# Patient Record
Sex: FEMALE | Race: WHITE | NOT HISPANIC OR LATINO | Employment: FULL TIME | URBAN - METROPOLITAN AREA
[De-identification: names, ages, dates, MRNs, and addresses within clinical notes are randomized per-mention and may not be internally consistent; named-entity substitution may affect disease eponyms.]

---

## 2022-01-05 ENCOUNTER — OFFICE VISIT (OUTPATIENT)
Dept: OBSTETRICS AND GYNECOLOGY | Facility: CLINIC | Age: 37
End: 2022-01-05

## 2022-01-05 VITALS
WEIGHT: 136.6 LBS | BODY MASS INDEX: 24.2 KG/M2 | DIASTOLIC BLOOD PRESSURE: 82 MMHG | HEIGHT: 63 IN | SYSTOLIC BLOOD PRESSURE: 124 MMHG

## 2022-01-05 DIAGNOSIS — N63.0 BREAST LUMP IN FEMALE: Primary | ICD-10-CM

## 2022-01-05 PROCEDURE — 99203 OFFICE O/P NEW LOW 30 MIN: CPT | Performed by: OBSTETRICS & GYNECOLOGY

## 2022-01-05 NOTE — PROGRESS NOTES
Chief Complaint   Patient presents with   • Establish Care   • Gynecologic Exam   • Breast Problem     right breast lump        Subjective   HPI  Bianca Chan is a 36 y.o. female, , who presents for established care, right breast lump is initial.      She states she has experienced this problem for 2 years but has worsened the past 6 months.  She describes the severity as mild-moderate. She states that the problem is worsening in size. The patient reports additional symptoms as breast tenderness.      Her last LMP was 2021.  Periods are regular every 28-30 days, lasting 5 days.  Dysmenorrhea:none.  Patient reports problems with: none.  Partner Status: Marital Status: .  New Partners since last visit: no.  Desires STD Screening: no.    Additional OB/GYN History   Current contraception: contraceptive methods: None  Desires to: do not start contraception  Last Pap : 2020, negative per pt. (performed at [facility], [place])  Last Completed Pap Smear     This patient has no relevant Health Maintenance data.        History of abnormal Pap smear: yes - 10-12 years ago, ASCUS but follow ups were negative  Last mammogram: , negative per pt. (performed in [place])  Last Completed Mammogram     This patient has no relevant Health Maintenance data.        Tobacco Usage?: No   OB History        2    Para   2    Term   2            AB        Living   2       SAB        IAB        Ectopic        Molar        Multiple        Live Births                    Health Maintenance   Topic Date Due   • Annual Gynecologic Pelvic and Breast Exam  Never done   • ANNUAL PHYSICAL  Never done   • COVID-19 Vaccine (1) Never done   • TDAP/TD VACCINES (1 - Tdap) Never done   • INFLUENZA VACCINE  Never done   • HEPATITIS C SCREENING  Never done   • PAP SMEAR  Never done   • Pneumococcal Vaccine 0-64  Aged Out       The additional following portions of the patient's history were reviewed and updated as  "appropriate: allergies, current medications, past family history, past medical history, past social history, past surgical history and problem list.    Review of Systems   Constitutional: Negative for activity change, appetite change, unexpected weight gain and unexpected weight loss.   Eyes: Negative for visual disturbance.   Respiratory: Negative for cough and shortness of breath.    Cardiovascular: Negative for chest pain and palpitations.   Gastrointestinal: Negative for abdominal distention, abdominal pain, nausea and vomiting.   Genitourinary: Positive for breast lump and breast pain. Negative for breast discharge, menstrual problem and pelvic pain.   Musculoskeletal: Negative for back pain.   Neurological: Negative for light-headedness and headache.   Psychiatric/Behavioral: Negative for agitation, behavioral problems, decreased concentration and depressed mood.       I have reviewed and agree with the HPI, ROS, and historical information as entered above. Moe Sky MD    Objective   /82   Ht 160 cm (63\")   Wt 62 kg (136 lb 9.6 oz)   LMP 12/25/2021 (Exact Date)   Breastfeeding No   BMI 24.20 kg/m²     Physical Exam  Vitals reviewed. Exam conducted with a chaperone present.   Constitutional:       Appearance: Normal appearance. She is normal weight.   HENT:      Head: Normocephalic and atraumatic.   Chest:   Breasts:      Right: Mass and tenderness present. No swelling, bleeding, inverted nipple, nipple discharge or skin change.      Left: Normal. No swelling, bleeding, inverted nipple, mass, nipple discharge, skin change or tenderness.            Comments: Multiple mobile masses palpated in the right breast  Skin:     General: Skin is warm and dry.   Neurological:      Mental Status: She is alert and oriented to person, place, and time.   Psychiatric:         Mood and Affect: Mood normal.         Behavior: Behavior normal.         Assessment/Plan     Assessment     Problem List Items " Addressed This Visit        Genitourinary and Reproductive     Breast lump in female - Primary    Relevant Orders    Ambulatory Referral to General Surgery (Completed)          1. Breast mass    Plan     Return for Referral made to [facility] Hern[facility]dez for breast mass. Follow up with me in June for annual GYN exam.  1. Will f/u in 6 months for annual GYN exam      Moe Sky MD  01/05/2022

## 2022-01-17 ENCOUNTER — TRANSCRIBE ORDERS (OUTPATIENT)
Dept: MAMMOGRAPHY | Facility: HOSPITAL | Age: 37
End: 2022-01-17

## 2022-01-17 DIAGNOSIS — N63.11 UNSPECIFIED LUMP IN THE RIGHT BREAST, UPPER OUTER QUADRANT: Primary | ICD-10-CM

## 2022-02-03 ENCOUNTER — APPOINTMENT (OUTPATIENT)
Dept: MAMMOGRAPHY | Facility: HOSPITAL | Age: 37
End: 2022-02-03

## 2022-02-10 ENCOUNTER — HOSPITAL ENCOUNTER (OUTPATIENT)
Dept: ULTRASOUND IMAGING | Facility: HOSPITAL | Age: 37
Discharge: HOME | End: 2022-02-10

## 2022-02-10 ENCOUNTER — HOSPITAL ENCOUNTER (OUTPATIENT)
Dept: MAMMOGRAPHY | Facility: HOSPITAL | Age: 37
Discharge: HOME | End: 2022-02-10

## 2022-02-10 ENCOUNTER — TELEPHONE (OUTPATIENT)
Dept: MAMMOGRAPHY | Facility: HOSPITAL | Age: 37
End: 2022-02-10

## 2022-02-10 ENCOUNTER — APPOINTMENT (OUTPATIENT)
Dept: OTHER | Facility: HOSPITAL | Age: 37
End: 2022-02-10

## 2022-02-10 DIAGNOSIS — N63.11 UNSPECIFIED LUMP IN THE RIGHT BREAST, UPPER OUTER QUADRANT: ICD-10-CM

## 2022-02-10 PROCEDURE — 76642 ULTRASOUND BREAST LIMITED: CPT | Performed by: RADIOLOGY

## 2022-02-10 PROCEDURE — 77062 BREAST TOMOSYNTHESIS BI: CPT | Performed by: RADIOLOGY

## 2022-02-10 PROCEDURE — 77066 DX MAMMO INCL CAD BI: CPT

## 2022-02-10 PROCEDURE — 76642 ULTRASOUND BREAST LIMITED: CPT

## 2022-02-10 PROCEDURE — 77066 DX MAMMO INCL CAD BI: CPT | Performed by: RADIOLOGY

## 2022-02-10 PROCEDURE — G0279 TOMOSYNTHESIS, MAMMO: HCPCS

## 2022-02-10 NOTE — TELEPHONE ENCOUNTER
Patient notified of surgical consult appointment with SVEN Hernández on 1/17/22 @ 0900/0930. Patient given office contact & location information. Told to bring photo ID, list of prescription & OTC medications, insurance information, must wear a mask & come to visit alone unless assistance is needed. Encouraged patient to call back or contact surgeon's office with further questions. Patient verbalized understanding.

## 2022-02-17 ENCOUNTER — TRANSCRIBE ORDERS (OUTPATIENT)
Dept: ADMINISTRATIVE | Facility: HOSPITAL | Age: 37
End: 2022-02-17

## 2022-02-17 DIAGNOSIS — Z11.59 ENCOUNTER FOR SCREENING FOR VIRAL DISEASE: Primary | ICD-10-CM

## 2022-03-01 ENCOUNTER — LAB (OUTPATIENT)
Dept: PREADMISSION TESTING | Facility: HOSPITAL | Age: 37
End: 2022-03-01

## 2022-03-01 DIAGNOSIS — Z11.59 ENCOUNTER FOR SCREENING FOR VIRAL DISEASE: ICD-10-CM

## 2022-03-01 LAB — SARS-COV-2 RNA PNL SPEC NAA+PROBE: NOT DETECTED

## 2022-03-01 PROCEDURE — C9803 HOPD COVID-19 SPEC COLLECT: HCPCS

## 2022-03-01 PROCEDURE — U0004 COV-19 TEST NON-CDC HGH THRU: HCPCS

## 2022-03-04 ENCOUNTER — LAB REQUISITION (OUTPATIENT)
Dept: LAB | Facility: HOSPITAL | Age: 37
End: 2022-03-04

## 2022-03-04 DIAGNOSIS — N63.11 UNSPECIFIED LUMP IN THE RIGHT BREAST, UPPER OUTER QUADRANT: ICD-10-CM

## 2022-03-04 PROCEDURE — 88305 TISSUE EXAM BY PATHOLOGIST: CPT | Performed by: SURGERY

## 2022-03-07 LAB
CYTO UR: NORMAL
LAB AP CASE REPORT: NORMAL
LAB AP CLINICAL INFORMATION: NORMAL
PATH REPORT.FINAL DX SPEC: NORMAL
PATH REPORT.GROSS SPEC: NORMAL

## 2022-05-26 NOTE — PATIENT INSTRUCTIONS
----- Message from Jessica Rivera CMA sent at 5/25/2022  5:12 PM CDT -----  Dr. Lopes attempted to reach patient, no answer, please contact him tomorrow. And provide referral information.      Fibroadenoma  A fibroadenoma is a lump (tumor) in the breast. The lump is benign. This means that it is not cancer. It may move under your skin when you touch it. This kind of lump can grow in one breast or in both breasts.  The cause of this condition is not known. Some of the lumps are too small to be felt. Others can be felt as firm, round, smooth, or movable lumps.  This kind of lump can be treated with regular breast exams. Exams are done to check for changes in the tumor. In some cases, the tumor is removed. The tumor can be removed if:  · It is large.  · It continues to grow.  · It is causing pain or changes in the skin of the breast.  · The patient is an adolescent girl. Lumps in young girls tend to grow over time.  Follow these instructions at home:  Breast exams    · Check your breasts at home as told by your doctor. Report any changes or concerns. Check for the following:  ? The size of the tumor.  ? The look and feel of the skin of your breasts.  ? The look and feel of your nipples.    General instructions  · If you had a lump removed, follow instructions from your doctor for home care after surgery.  · Do not use any products that contain nicotine or tobacco, such as cigarettes and e-cigarettes. These can further increase your cancer risk. If you need help quitting, ask your doctor.  · Keep all follow-up visits as told by your doctor. This is important. You will need breast exams on a regular basis.  Contact a doctor if:  · The lump changes in size or feels different.  · The lump starts to be painful.  · You find a new lump.  · You have any changes in the skin that covers your breast.  · You have any changes in your nipple.  · You have fluid leaking from your nipple.  · You have redness around your nipple.  Summary  · A fibroadenoma is a lump (tumor) in the breast. The lump is benign. This means that it is not cancer.  · This tumor may feel like a firm, round, smooth, and movable lump in your breast.  Some fibroadenomas are too small to be felt.  · Having this condition may slightly increase your risk for developing breast cancer in the future.  · Do breast exams at home. Watch for changes in the size of the lump. Also, watch for changes in the look and feel of your nipples and the skin of your breast.  · Contact your doctor if the lump grows bigger or starts to cause pain. Also, let your doctor know if there is a change in the way your nipples look and in the feel of the skin of your breast.  This information is not intended to replace advice given to you by your health care provider. Make sure you discuss any questions you have with your health care provider.  Document Revised: 12/31/2018 Document Reviewed: 12/31/2018  Elsevier Patient Education © 2021 [employer]

## 2022-06-14 ENCOUNTER — OFFICE VISIT (OUTPATIENT)
Dept: OBSTETRICS AND GYNECOLOGY | Facility: CLINIC | Age: 37
End: 2022-06-14

## 2022-06-14 VITALS
WEIGHT: 140 LBS | HEIGHT: 63 IN | SYSTOLIC BLOOD PRESSURE: 118 MMHG | DIASTOLIC BLOOD PRESSURE: 76 MMHG | BODY MASS INDEX: 24.8 KG/M2

## 2022-06-14 DIAGNOSIS — G43.809 OTHER MIGRAINE WITHOUT STATUS MIGRAINOSUS, NOT INTRACTABLE: ICD-10-CM

## 2022-06-14 DIAGNOSIS — Z01.419 ENCOUNTER FOR ANNUAL ROUTINE GYNECOLOGICAL EXAMINATION: Primary | ICD-10-CM

## 2022-06-14 LAB
25(OH)D3+25(OH)D2 SERPL-MCNC: 14.7 NG/ML (ref 30–100)
ALBUMIN SERPL-MCNC: 4 G/DL (ref 3.5–5.2)
ALBUMIN/GLOB SERPL: 1.4 G/DL
ALP SERPL-CCNC: 43 U/L (ref 39–117)
ALT SERPL-CCNC: 10 U/L (ref 1–33)
AST SERPL-CCNC: 14 U/L (ref 1–32)
BILIRUB SERPL-MCNC: 0.5 MG/DL (ref 0–1.2)
BUN SERPL-MCNC: 7 MG/DL (ref 6–20)
BUN/CREAT SERPL: 11.5 (ref 7–25)
CALCIUM SERPL-MCNC: 8.8 MG/DL (ref 8.6–10.5)
CHLORIDE SERPL-SCNC: 106 MMOL/L (ref 98–107)
CHOLEST SERPL-MCNC: 244 MG/DL (ref 0–200)
CO2 SERPL-SCNC: 21.4 MMOL/L (ref 22–29)
CREAT SERPL-MCNC: 0.61 MG/DL (ref 0.57–1)
EGFRCR SERPLBLD CKD-EPI 2021: 119 ML/MIN/1.73
ERYTHROCYTE [DISTWIDTH] IN BLOOD BY AUTOMATED COUNT: 14.1 % (ref 12.3–15.4)
GLOBULIN SER CALC-MCNC: 2.9 GM/DL
GLUCOSE SERPL-MCNC: 90 MG/DL (ref 65–99)
HBA1C MFR BLD: 5 % (ref 4.8–5.6)
HCT VFR BLD AUTO: 35.2 % (ref 34–46.6)
HDLC SERPL-MCNC: 86 MG/DL (ref 40–60)
HGB BLD-MCNC: 11.5 G/DL (ref 12–15.9)
LDLC SERPL CALC-MCNC: 147 MG/DL (ref 0–100)
MCH RBC QN AUTO: 29 PG (ref 26.6–33)
MCHC RBC AUTO-ENTMCNC: 32.7 G/DL (ref 31.5–35.7)
MCV RBC AUTO: 88.9 FL (ref 79–97)
PLATELET # BLD AUTO: 299 10*3/MM3 (ref 140–450)
POTASSIUM SERPL-SCNC: 4 MMOL/L (ref 3.5–5.2)
PROT SERPL-MCNC: 6.9 G/DL (ref 6–8.5)
RBC # BLD AUTO: 3.96 10*6/MM3 (ref 3.77–5.28)
SODIUM SERPL-SCNC: 137 MMOL/L (ref 136–145)
TRIGL SERPL-MCNC: 67 MG/DL (ref 0–150)
TSH SERPL DL<=0.005 MIU/L-ACNC: 1.24 UIU/ML (ref 0.27–4.2)
VLDLC SERPL CALC-MCNC: 11 MG/DL (ref 5–40)
WBC # BLD AUTO: 6.9 10*3/MM3 (ref 3.4–10.8)

## 2022-06-14 PROCEDURE — 99214 OFFICE O/P EST MOD 30 MIN: CPT | Performed by: OBSTETRICS & GYNECOLOGY

## 2022-06-14 PROCEDURE — 99395 PREV VISIT EST AGE 18-39: CPT | Performed by: OBSTETRICS & GYNECOLOGY

## 2022-06-14 RX ORDER — BUTALBITAL, ACETAMINOPHEN AND CAFFEINE 300; 40; 50 MG/1; MG/1; MG/1
CAPSULE ORAL
COMMUNITY
Start: 2022-04-01

## 2022-06-14 RX ORDER — BUTALBITAL, ACETAMINOPHEN AND CAFFEINE 300; 40; 50 MG/1; MG/1; MG/1
1 CAPSULE ORAL EVERY 6 HOURS PRN
Qty: 30 CAPSULE | Refills: 0 | Status: SHIPPED | OUTPATIENT
Start: 2022-06-14

## 2022-06-14 NOTE — PROGRESS NOTES
GYN Annual Exam     CC - Here for annual exam.     Subjective   HPI  Bianca Chan is a 36 y.o. female, , who presents for annual well woman exam. Patient's last menstrual period was 2022 (exact date).  Periods are regular every 25-35 days, lasting 6 days. The patient uses 1 of tampons/pads per hour.  Dysmenorrhea:none.  Patient reports problems with: lump on left rib cage that has been present for about 1 year and migraines that have been present since she was about 15 years old. Patient states she previously was seeing a neurologist in [place], [place] that prescribed her fiorcet but then moved here and needs to establish with someone.  Partner Status: Marital Status: .  New Partners since last visit: no.  Desires STD Screening: no.    Additional OB/GYN History   Current contraception: contraceptive methods: None  Desires to: do not start contraception  Last Pap : 2020, negative per pt. (performed at [place], [place])  Last Completed Pap Smear     This patient has no relevant Health Maintenance data.        History of abnormal Pap smear: yes - 12 years ago, results unknown but follow up was negative per pt.  Family history of uterine, colon, breast, or ovarian cancer: no  Performs monthly Self-Breast Exam: yes  Exercises Regularly:yes  Feelings of Anxiety or Depression: yes - both  Tobacco Usage?: No   OB History        2    Para   2    Term   2            AB        Living   2       SAB        IAB        Ectopic        Molar        Multiple        Live Births                    Health Maintenance   Topic Date Due   • Annual Gynecologic Pelvic and Breast Exam  Never done   • ANNUAL PHYSICAL  Never done   • COVID-19 Vaccine (1) Never done   • TDAP/TD VACCINES (1 - Tdap) Never done   • HEPATITIS C SCREENING  Never done   • PAP SMEAR  Never done   • INFLUENZA VACCINE  10/01/2022   • Pneumococcal Vaccine 0-64  Aged Out       The additional following portions of the patient's  "history were reviewed and updated as appropriate: allergies, current medications, past family history, past medical history, past social history, past surgical history and problem list.    Review of Systems   Constitutional: Negative.    HENT: Negative.    Respiratory: Negative.    Cardiovascular: Negative.    Gastrointestinal: Negative.    Genitourinary: Negative.    Musculoskeletal: Negative.    Neurological: Positive for headache. Negative for dizziness, seizures, facial asymmetry, weakness, light-headedness and numbness.   Hematological: Negative.    Psychiatric/Behavioral: Negative.        I have reviewed and agree with the HPI, ROS, and historical information as entered above. Moe Sky MD    Objective   /76   Ht 160 cm (62.99\")   Wt 63.5 kg (140 lb)   LMP 05/25/2022 (Exact Date)   Breastfeeding No   BMI 24.81 kg/m²     Physical Exam  Vitals reviewed. Exam conducted with a chaperone present.   Constitutional:       Appearance: Normal appearance. She is normal weight.   HENT:      Head: Normocephalic and atraumatic.   Cardiovascular:      Rate and Rhythm: Normal rate and regular rhythm.   Pulmonary:      Effort: Pulmonary effort is normal.      Breath sounds: Normal breath sounds.   Chest:   Breasts:      Right: Normal.      Left: Normal.       Abdominal:      General: Abdomen is flat. Bowel sounds are normal.      Palpations: Abdomen is soft.   Genitourinary:     General: Normal vulva.      Vagina: Normal.      Cervix: Normal.      Uterus: Normal.       Adnexa: Right adnexa normal and left adnexa normal.   Musculoskeletal:      Cervical back: Neck supple.   Skin:     General: Skin is warm and dry.   Neurological:      Mental Status: She is alert and oriented to person, place, and time.   Psychiatric:         Mood and Affect: Mood normal.         Behavior: Behavior normal.         Assessment & Plan     Assessment     Problem List Items Addressed This Visit    None     Visit Diagnoses     " Encounter for annual routine gynecological examination    -  Primary    Relevant Orders    LIQUID-BASED PAP SMEAR, [facility] ([facility])    CBC (No Diff)    Hemoglobin A1c    Lipid Panel    TSH    Vitamin D 25 Hydroxy    Comprehensive Metabolic Panel    Other migraine without status migrainosus, not intractable        Relevant Medications    butalbital-acetaminophen-caffeine (ORBIVAN) -40 MG capsule capsule    butalbital-acetaminophen-caffeine (Fioricet) -40 MG capsule capsule    Other Relevant Orders    Ambulatory Referral to Neurology          1. GYN annual well woman exam.   2. Migraine headache    Plan     1. Annual gynecologic exam performed today including breast and pelvic exam. Pap smear performed. Age appropriate labs performed today. Preventative care information discussed and all questions answered.   2. Migraine- will treat with fioricet and refer to Neurology.       Moe Sky MD  06/14/2022

## 2022-06-15 LAB — REF LAB TEST METHOD: NORMAL

## 2022-06-15 RX ORDER — ERGOCALCIFEROL 1.25 MG/1
50000 CAPSULE ORAL WEEKLY
Qty: 4 CAPSULE | Refills: 2 | Status: SHIPPED | OUTPATIENT
Start: 2022-06-15 | End: 2022-09-13

## 2022-06-16 ENCOUNTER — TELEPHONE (OUTPATIENT)
Dept: OBSTETRICS AND GYNECOLOGY | Facility: CLINIC | Age: 37
End: 2022-06-16

## 2024-10-08 ENCOUNTER — LAB (OUTPATIENT)
Age: 39
End: 2024-10-08
Payer: COMMERCIAL

## 2024-10-08 ENCOUNTER — OFFICE VISIT (OUTPATIENT)
Age: 39
End: 2024-10-08
Payer: COMMERCIAL

## 2024-10-08 VITALS
HEART RATE: 74 BPM | RESPIRATION RATE: 18 BRPM | WEIGHT: 145.3 LBS | BODY MASS INDEX: 25.75 KG/M2 | OXYGEN SATURATION: 98 % | HEIGHT: 63 IN | DIASTOLIC BLOOD PRESSURE: 84 MMHG | SYSTOLIC BLOOD PRESSURE: 122 MMHG

## 2024-10-08 DIAGNOSIS — E55.9 VITAMIN D DEFICIENCY: ICD-10-CM

## 2024-10-08 DIAGNOSIS — E78.00 PURE HYPERCHOLESTEROLEMIA: ICD-10-CM

## 2024-10-08 DIAGNOSIS — Z00.00 HEALTHCARE MAINTENANCE: ICD-10-CM

## 2024-10-08 DIAGNOSIS — E66.3 OVERWEIGHT (BMI 25.0-29.9): ICD-10-CM

## 2024-10-08 DIAGNOSIS — Z00.00 HEALTHCARE MAINTENANCE: Primary | ICD-10-CM

## 2024-10-08 DIAGNOSIS — G56.02 CARPAL TUNNEL SYNDROME OF LEFT WRIST: ICD-10-CM

## 2024-10-08 PROBLEM — G43.109 MIGRAINE WITH AURA, NOT INTRACTABLE: Status: ACTIVE | Noted: 2018-11-05

## 2024-10-08 PROCEDURE — 99385 PREV VISIT NEW AGE 18-39: CPT | Performed by: STUDENT IN AN ORGANIZED HEALTH CARE EDUCATION/TRAINING PROGRAM

## 2024-10-08 PROCEDURE — 90656 IIV3 VACC NO PRSV 0.5 ML IM: CPT | Performed by: STUDENT IN AN ORGANIZED HEALTH CARE EDUCATION/TRAINING PROGRAM

## 2024-10-08 PROCEDURE — 83036 HEMOGLOBIN GLYCOSYLATED A1C: CPT | Performed by: STUDENT IN AN ORGANIZED HEALTH CARE EDUCATION/TRAINING PROGRAM

## 2024-10-08 PROCEDURE — 82306 VITAMIN D 25 HYDROXY: CPT | Performed by: STUDENT IN AN ORGANIZED HEALTH CARE EDUCATION/TRAINING PROGRAM

## 2024-10-08 PROCEDURE — 36415 COLL VENOUS BLD VENIPUNCTURE: CPT | Performed by: STUDENT IN AN ORGANIZED HEALTH CARE EDUCATION/TRAINING PROGRAM

## 2024-10-08 PROCEDURE — 80053 COMPREHEN METABOLIC PANEL: CPT | Performed by: STUDENT IN AN ORGANIZED HEALTH CARE EDUCATION/TRAINING PROGRAM

## 2024-10-08 PROCEDURE — 80061 LIPID PANEL: CPT | Performed by: STUDENT IN AN ORGANIZED HEALTH CARE EDUCATION/TRAINING PROGRAM

## 2024-10-08 PROCEDURE — 85027 COMPLETE CBC AUTOMATED: CPT | Performed by: STUDENT IN AN ORGANIZED HEALTH CARE EDUCATION/TRAINING PROGRAM

## 2024-10-08 PROCEDURE — 90471 IMMUNIZATION ADMIN: CPT | Performed by: STUDENT IN AN ORGANIZED HEALTH CARE EDUCATION/TRAINING PROGRAM

## 2024-10-08 RX ORDER — GALCANEZUMAB 120 MG/ML
INJECTION, SOLUTION SUBCUTANEOUS
COMMUNITY
Start: 2023-12-08

## 2024-10-08 RX ORDER — GABAPENTIN 100 MG/1
100 CAPSULE ORAL NIGHTLY PRN
Qty: 30 CAPSULE | Refills: 0 | Status: SHIPPED | OUTPATIENT
Start: 2024-10-08

## 2024-10-08 RX ORDER — ESCITALOPRAM OXALATE 10 MG/1
10 TABLET ORAL DAILY
Qty: 30 TABLET | Refills: 2 | Status: SHIPPED | OUTPATIENT
Start: 2024-10-08

## 2024-10-08 RX ORDER — ONDANSETRON 4 MG/1
TABLET, ORALLY DISINTEGRATING ORAL
COMMUNITY
Start: 2024-08-20

## 2024-10-08 RX ORDER — UBROGEPANT 100 MG/1
TABLET ORAL
COMMUNITY
Start: 2024-05-08

## 2024-10-08 RX ORDER — PROPRANOLOL HCL 20 MG
20 TABLET ORAL
COMMUNITY
Start: 2024-09-02

## 2024-10-08 RX ORDER — BUPRENORPHINE AND NALOXONE 4; 1 MG/1; MG/1
FILM, SOLUBLE BUCCAL; SUBLINGUAL
COMMUNITY
Start: 2024-10-01
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
## 2024-10-08 NOTE — ASSESSMENT & PLAN NOTE
Patient's (Body mass index is 25.75 kg/m².) indicates that they are overweight with health conditions that include none . Weight is newly identified. BMI is above average; BMI management plan is completed. We discussed  diet, exercise .

## 2024-10-09 LAB
25(OH)D3 SERPL-MCNC: 20.4 NG/ML (ref 30–100)
ALBUMIN SERPL-MCNC: 4.4 G/DL (ref 3.5–5.2)
ALBUMIN/GLOB SERPL: 1.6 G/DL
ALP SERPL-CCNC: 46 U/L (ref 39–117)
ALT SERPL W P-5'-P-CCNC: 14 U/L (ref 1–33)
ANION GAP SERPL CALCULATED.3IONS-SCNC: 10.5 MMOL/L (ref 5–15)
AST SERPL-CCNC: 19 U/L (ref 1–32)
BILIRUB SERPL-MCNC: 0.3 MG/DL (ref 0–1.2)
BUN SERPL-MCNC: 11 MG/DL (ref 6–20)
BUN/CREAT SERPL: 17.7 (ref 7–25)
CALCIUM SPEC-SCNC: 10 MG/DL (ref 8.6–10.5)
CHLORIDE SERPL-SCNC: 100 MMOL/L (ref 98–107)
CHOLEST SERPL-MCNC: 244 MG/DL (ref 0–200)
CO2 SERPL-SCNC: 25.5 MMOL/L (ref 22–29)
CREAT SERPL-MCNC: 0.62 MG/DL (ref 0.57–1)
DEPRECATED RDW RBC AUTO: 48.2 FL (ref 37–54)
EGFRCR SERPLBLD CKD-EPI 2021: 116.3 ML/MIN/1.73
ERYTHROCYTE [DISTWIDTH] IN BLOOD BY AUTOMATED COUNT: 13.7 % (ref 12.3–15.4)
GLOBULIN UR ELPH-MCNC: 2.8 GM/DL
GLUCOSE SERPL-MCNC: 80 MG/DL (ref 65–99)
HBA1C MFR BLD: 4.9 % (ref 4.8–5.6)
HCT VFR BLD AUTO: 39 % (ref 34–46.6)
HDLC SERPL-MCNC: 88 MG/DL (ref 40–60)
HGB BLD-MCNC: 12.8 G/DL (ref 12–15.9)
LDLC SERPL CALC-MCNC: 147 MG/DL (ref 0–100)
LDLC/HDLC SERPL: 1.65 {RATIO}
MCH RBC QN AUTO: 31.4 PG (ref 26.6–33)
MCHC RBC AUTO-ENTMCNC: 32.8 G/DL (ref 31.5–35.7)
MCV RBC AUTO: 95.8 FL (ref 79–97)
PLATELET # BLD AUTO: 349 10*3/MM3 (ref 140–450)
PMV BLD AUTO: 10.3 FL (ref 6–12)
POTASSIUM SERPL-SCNC: 4.2 MMOL/L (ref 3.5–5.2)
PROT SERPL-MCNC: 7.2 G/DL (ref 6–8.5)
RBC # BLD AUTO: 4.07 10*6/MM3 (ref 3.77–5.28)
SODIUM SERPL-SCNC: 136 MMOL/L (ref 136–145)
TRIGL SERPL-MCNC: 53 MG/DL (ref 0–150)
VLDLC SERPL-MCNC: 9 MG/DL (ref 5–40)
WBC NRBC COR # BLD AUTO: 8.53 10*3/MM3 (ref 3.4–10.8)

## 2024-10-10 RX ORDER — ERGOCALCIFEROL 1.25 MG/1
50000 CAPSULE, LIQUID FILLED ORAL WEEKLY
Qty: 6 CAPSULE | Refills: 0 | Status: SHIPPED | OUTPATIENT
Start: 2024-10-10

## 2024-10-14 LAB
1OH-MIDAZOLAM UR QL SCN: NOT DETECTED NG/MG CREAT
6MAM UR QL SCN: NEGATIVE NG/ML
7AMINOCLONAZEPAM/CREAT UR: NOT DETECTED NG/MG CREAT
A-OH ALPRAZ/CREAT UR: NOT DETECTED NG/MG CREAT
A-OH-TRIAZOLAM/CREAT UR CFM: NOT DETECTED NG/MG CREAT
ACP UR QL CFM: NOT DETECTED
ALPRAZ/CREAT UR CFM: NOT DETECTED NG/MG CREAT
AMPHETAMINES UR QL SCN: NEGATIVE NG/ML
APAP UR QL SCN: NEGATIVE UG/ML
BARBITURATES UR QL SCN: NEGATIVE NG/ML
BENZODIAZ SCN METH UR: NEGATIVE
BUPRENORPHINE UR QL SCN: NORMAL
BUPRENORPHINE/CREAT UR: 17 NG/MG CREAT
CANNABINOIDS UR QL SCN: NEGATIVE NG/ML
CARISOPRODOL UR QL: NEGATIVE NG/ML
CLONAZEPAM/CREAT UR CFM: NOT DETECTED NG/MG CREAT
COCAINE+BZE UR QL SCN: NEGATIVE NG/ML
CREAT UR-MCNC: 78 MG/DL
D-METHORPHAN UR-MCNC: NOT DETECTED NG/ML
D-METHORPHAN+LEVORPHANOL UR QL: NOT DETECTED
DESALKYLFLURAZ/CREAT UR: NOT DETECTED NG/MG CREAT
DIAZEPAM/CREAT UR: NOT DETECTED NG/MG CREAT
ETG ETS UR QL CFM: NORMAL
ETHANOL UR QL SCN: NEGATIVE G/DL
ETHANOL UR QL SCN: NORMAL NG/ML
ETHYL GLUCURONIDE UR CFM-MCNC: NORMAL NG/MG CREAT
ETHYL SULFATE UR CFM-MCNC: NORMAL NG/MG CREAT
FENTANYL CTO UR SCN-MCNC: NEGATIVE NG/ML
FENTANYL/CREAT UR: NOT DETECTED NG/MG CREAT
FLUNITRAZEPAM UR QL SCN: NOT DETECTED NG/MG CREAT
GABAPENTIN UR-MCNC: NEGATIVE UG/ML
HALLUCINOGENS UR: NEGATIVE
HYPNOTICS UR QL SCN: NEGATIVE
KETAMINE UR QL: NOT DETECTED
LORAZEPAM/CREAT UR: NOT DETECTED NG/MG CREAT
MEPERIDINE UR QL SCN: NEGATIVE NG/ML
METHADONE UR QL SCN: NEGATIVE NG/ML
METHADONE+METAB UR QL SCN: NEGATIVE NG/ML
MIDAZOLAM/CREAT UR CFM: NOT DETECTED NG/MG CREAT
MISCELLANEOUS, UR: NEGATIVE
NORBUPRENORPHINE/CREAT UR: 96 NG/MG CREAT
NORDIAZEPAM/CREAT UR: NOT DETECTED NG/MG CREAT
NORFENTANYL/CREAT UR: NOT DETECTED NG/MG CREAT
NORFLUNITRAZEPAM UR-MCNC: NOT DETECTED NG/MG CREAT
NORKETAMINE UR-MCNC: NOT DETECTED UG/ML
OPIATES UR SCN-MCNC: NEGATIVE NG/ML
OXAZEPAM/CREAT UR: NOT DETECTED NG/MG CREAT
OXYCODONE CTO UR SCN-MCNC: NEGATIVE NG/ML
PCP UR QL SCN: NEGATIVE NG/ML
PRESCRIBED MEDICATIONS: NORMAL
PROPOXYPH UR QL SCN: NEGATIVE NG/ML
TAPENTADOL CTO UR SCN-MCNC: NEGATIVE NG/ML
TEMAZEPAM/CREAT UR: NOT DETECTED NG/MG CREAT
TRAMADOL UR QL SCN: NEGATIVE NG/ML
ZALEPLON UR-MCNC: NOT DETECTED NG/ML
ZOLPIDEM PHENYL-4-CARB UR QL SCN: NOT DETECTED
ZOLPIDEM UR QL SCN: NOT DETECTED
ZOPICLONE-N-OXIDE UR-MCNC: NOT DETECTED NG/ML

## 2024-11-05 ENCOUNTER — TELEMEDICINE (OUTPATIENT)
Age: 39
End: 2024-11-05
Payer: COMMERCIAL

## 2024-11-05 DIAGNOSIS — F41.1 GAD (GENERALIZED ANXIETY DISORDER): Primary | ICD-10-CM

## 2024-11-05 PROBLEM — R22.2 SUBCUTANEOUS NODULE OF ABDOMINAL WALL: Status: ACTIVE | Noted: 2024-05-15

## 2024-11-05 PROCEDURE — 99213 OFFICE O/P EST LOW 20 MIN: CPT | Performed by: STUDENT IN AN ORGANIZED HEALTH CARE EDUCATION/TRAINING PROGRAM

## 2024-11-05 NOTE — PROGRESS NOTES
Mode of Visit: Video  Location of patient: home  Location of provider: [facility]  You have chosen to receive care through a telehealth visit.  The patient has signed the video visit consent form.  The visit included audio and video interaction. No technical issues occurred during this visit.           Office Note     Name: Bianca Chan    : 1985     MRN: [id number]     Chief Complaint  Anxiety follow up     Subjective     History of Present Illness:  Bianca Chna is a 39 y.o. female who presents today for follow up on anxiety   She reports she is compliant with the Lexapro and has noticed an improvement in her anxiety since starting it.  She is only having to take the as needed propranolol 20 mg once or twice a week.  She reports that she is currently happy on her 10 mg of Lexapro and is not interested in increasing the dose.  She denies any significant side effects with the Lexapro    She has no other complaints today          Past Medical History:   Past Medical History:   Diagnosis Date    Abnormal Pap smear of cervix     10-12 years ago ([facility]US) follow up was normal per pt.     Anxiety     Depression     Fibromyoma     Migraine        Past Surgical History:   Past Surgical History:   Procedure Laterality Date    BREAST EXCISIONAL BIOPSY Bilateral     starting 6812-7561    BREAST MASS EXCISION Bilateral        Immunizations:   Immunization History   Administered Date(s) Administered    COVID-19 (MODERNA) Monovalent Original Booster 2021    Fluzone  >6mos 10/08/2024    Fluzone (or Fluarix & Flulaval for VFC) >6mos 2021    Tdap 2023        Medications:     Current Outpatient Medications:     buprenorphine-naloxone (SUBOXONE) 4-1 MG film sublingual film, PLACE 1/2 FILM UNDER TONGUE ONCE A DAY, Disp: , Rfl:     butalbital-acetaminophen-caffeine (Fioricet) -40 MG capsule capsule, Take 1 capsule by mouth Every 6 (Six) Hours As Needed (migraine)., Disp: 30 capsule, Rfl: 0     "Emgality 120 MG/ML auto-injector pen, , Disp: , Rfl:     escitalopram (Lexapro) 10 MG tablet, Take 1 tablet by mouth Daily., Disp: 30 tablet, Rfl: 2    gabapentin (NEURONTIN) 100 MG capsule, Take 1 capsule by mouth At Night As Needed (nerve pain)., Disp: 30 capsule, Rfl: 0    ondansetron ODT (ZOFRAN-ODT) 4 MG disintegrating tablet, , Disp: , Rfl:     propranolol (INDERAL) 20 MG tablet, Take 1 tablet by mouth., Disp: , Rfl:     Ubrelvy 100 MG tablet, TAKE 1 TABLET BY MOUTH ONCE IF NEEDED FOR MIGRAINE. AFTER 2 HOURS, A SECOND DOSE MAY BE TAKEN IF NEEDED. MAX DOSE  MG (2 TABLETS) IN 24 HOURS., Disp: , Rfl:     vitamin D (ERGOCALCIFEROL) 1.25 MG (55667 UT) capsule capsule, Take 1 capsule by mouth 1 (One) Time Per Week., Disp: 6 capsule, Rfl: 0    Allergies:   No Known Allergies    Family History:   Family History   Problem Relation Age of Onset    Hypertension Father     Stroke Father     Hypertension Mother     Hypertension Paternal Grandfather     Hypertension Paternal Grandmother     Diabetes Paternal Grandmother     Hypertension Maternal Grandmother     Hypertension Maternal Grandfather     Diabetes Maternal Grandfather        Social History:   Social History     Socioeconomic History    Marital status:    Tobacco Use    Smoking status: Never    Smokeless tobacco: Never   Vaping Use    Vaping status: Never Used   Substance and Sexual Activity    Alcohol use: Yes     Alcohol/week: 5.0 standard drinks of alcohol     Types: 3 Glasses of wine, 2 Drinks containing 0.5 oz of alcohol per week     Comment: occasionally / 1-2x monthly     Drug use: Never    Sexual activity: Yes     Partners: Male     Birth control/protection: None         Objective     Vital Signs  There were no vitals taken for this visit.  Estimated body mass index is 25.75 kg/m² as calculated from the following:    Height as of 10/8/24: 160 cm (62.99\").    Weight as of 10/8/24: 65.9 kg (145 lb 4.8 oz).            Physical " Exam  Constitutional:       General: She is not in acute distress.     Appearance: Normal appearance. She is not ill-appearing.   Pulmonary:      Effort: Pulmonary effort is normal. No respiratory distress.   Neurological:      General: No focal deficit present.      Mental Status: She is alert and oriented to person, place, and time.   Psychiatric:         Mood and Affect: Mood normal.         Behavior: Behavior normal.          Assessment and Plan     Diagnoses and all orders for this visit:    1. MARCO ANTONIO (generalized anxiety disorder) (Primary)  Assessment & Plan:  Well controlled   Continue lexapro 10mg QD and prn propranolol 20mg                Follow Up  No follow-ups on file.    Liane Paniagua MD   [facility]  [address] [address]  [phone]

## 2025-01-14 RX ORDER — ESCITALOPRAM OXALATE 10 MG/1
10 TABLET ORAL DAILY
Qty: 90 TABLET | Refills: 3 | Status: SHIPPED | OUTPATIENT
Start: 2025-01-14

## 2025-01-14 NOTE — TELEPHONE ENCOUNTER
Rx Refill Note  Requested Prescriptions     Pending Prescriptions Disp Refills    escitalopram (LEXAPRO) 10 MG tablet [Pharmacy Med Name: ESCITALOPRAM 10 MG TAB 10 Tablet] 30 tablet 2     Sig: TAKE 1 TABLET BY MOUTH DAILY.      Last office visit with prescribing clinician: 10/8/2024   Last telemedicine visit with prescribing clinician: 11/5/2024   Next office visit with prescribing clinician: Visit date not found   \  Jyotsna Harley MA  01/14/25, 17:49 EST      Did you want a follow up

## 2025-04-11 ENCOUNTER — OFFICE VISIT (OUTPATIENT)
Age: 40
End: 2025-04-11
Payer: COMMERCIAL

## 2025-04-11 VITALS
HEART RATE: 70 BPM | SYSTOLIC BLOOD PRESSURE: 114 MMHG | BODY MASS INDEX: 26.22 KG/M2 | WEIGHT: 148 LBS | HEIGHT: 63 IN | RESPIRATION RATE: 18 BRPM | DIASTOLIC BLOOD PRESSURE: 70 MMHG | OXYGEN SATURATION: 98 %

## 2025-04-11 DIAGNOSIS — F41.1 GAD (GENERALIZED ANXIETY DISORDER): Primary | ICD-10-CM

## 2025-04-11 DIAGNOSIS — L25.9 CONTACT DERMATITIS, UNSPECIFIED CONTACT DERMATITIS TYPE, UNSPECIFIED TRIGGER: ICD-10-CM

## 2025-04-11 DIAGNOSIS — G56.02 CARPAL TUNNEL SYNDROME OF LEFT WRIST: ICD-10-CM

## 2025-04-11 PROBLEM — N63.0 BREAST LUMP IN FEMALE: Status: RESOLVED | Noted: 2022-01-05 | Resolved: 2025-04-11

## 2025-04-11 PROBLEM — R22.2 SUBCUTANEOUS NODULE OF ABDOMINAL WALL: Status: RESOLVED | Noted: 2024-05-15 | Resolved: 2025-04-11

## 2025-04-11 PROCEDURE — 99214 OFFICE O/P EST MOD 30 MIN: CPT | Performed by: STUDENT IN AN ORGANIZED HEALTH CARE EDUCATION/TRAINING PROGRAM

## 2025-04-11 RX ORDER — PROPRANOLOL HCL 20 MG
20 TABLET ORAL DAILY PRN
Qty: 30 TABLET | Refills: 11 | Status: SHIPPED | OUTPATIENT
Start: 2025-04-11

## 2025-04-11 RX ORDER — ESCITALOPRAM OXALATE 20 MG/1
20 TABLET ORAL DAILY
Qty: 90 TABLET | Refills: 3 | Status: SHIPPED | OUTPATIENT
Start: 2025-04-11

## 2025-04-11 RX ORDER — ESCITALOPRAM OXALATE 10 MG/1
10 TABLET ORAL DAILY
Qty: 90 TABLET | Refills: 3 | Status: CANCELLED | OUTPATIENT
Start: 2025-04-11

## 2025-04-11 RX ORDER — GABAPENTIN 100 MG/1
100 CAPSULE ORAL NIGHTLY PRN
Qty: 30 CAPSULE | Refills: 0 | Status: CANCELLED | OUTPATIENT
Start: 2025-04-11

## 2025-04-11 RX ORDER — PROPRANOLOL HCL 20 MG
20 TABLET ORAL
Status: CANCELLED | OUTPATIENT
Start: 2025-04-11

## 2025-04-11 RX ORDER — GALCANEZUMAB 120 MG/ML
INJECTION, SOLUTION SUBCUTANEOUS
Qty: 1.12 ML | Status: CANCELLED | OUTPATIENT
Start: 2025-04-11

## 2025-04-11 RX ORDER — UBROGEPANT 100 MG/1
TABLET ORAL
Qty: 30 TABLET | Status: CANCELLED | OUTPATIENT
Start: 2025-04-11

## 2025-04-11 RX ORDER — BUTALBITAL, ACETAMINOPHEN AND CAFFEINE 300; 40; 50 MG/1; MG/1; MG/1
1 CAPSULE ORAL EVERY 6 HOURS PRN
Qty: 30 CAPSULE | Refills: 0 | Status: CANCELLED | OUTPATIENT
Start: 2025-04-11

## 2025-04-11 RX ORDER — ONDANSETRON 4 MG/1
TABLET, ORALLY DISINTEGRATING ORAL
Status: CANCELLED | OUTPATIENT
Start: 2025-04-11

## 2025-04-11 NOTE — PROGRESS NOTES
Answers submitted by the patient for this visit:  Anxiety (Submitted on 2025)  Chief Complaint: Anxiety  Visit: follow-up  Frequency: constantly  Severity: severe  chest pain: Yes  depressed mood: Yes  dry mouth: Yes  excessive worry: Yes  insomnia: Yes  irritability: Yes  malaise/fatigue: Yes  palpitations: Yes  Sleep quality: poor  Hours of sleep per night: 4 Hours  Aggravated by: family issues, work stress  Medication compliance: %      Office Note     Name: Bianca Chan    : 1985     MRN: [id number]     Chief Complaint  Anxiety, dry skin, carpal tunnel     Subjective     History of Present Illness:    History of Present Illness  The patient is a 39-year-old female here to follow up on anxiety and discuss dry skin.    Her Lexapro was initially effective, but anxiety has increased over the past few months due to work, family, and political climate. She has not increased her Lexapro dosage. She sees a therapist monthly and takes propranolol once a day three times a week.    Dryness above her R eye began at the start of the year and spread to the sides of her face. Moisturizers, including expensive ones, have not helped. She uses CeraVe for cleansing and La Roche-Posay moisturizer. She avoids makeup on irritated areas, but irritation persists even after abstaining for 1.5 weeks. No runny nose or itchy eyes. No changes in laundry detergent or shampoo. Increased breakouts, with a recent cyst-like lesion on her forehead. Uses La Roche-Posay triple repair cream at night and double repair moisturizer in the morning. No exfoliating products, BHA, salicylic acid, or benzoyl peroxide. No allergies to her pets. No hand jewelry, nail treatments, or gardening. No history of eczema or asthma.    Carpal tunnel syndrome symptoms fluctuate, with no hand weakness. Gabapentin provides relief for numbness and tingling. Considering regular use of gabapentin. Inconsistent brace use, but symptoms improve with  it.    Completed high-dose vitamin D course, now taking supplements.            Past Medical History:   Past Medical History:   Diagnosis Date   • Abnormal Pap smear of cervix     10-12 years ago ([facility]) follow up was normal per pt.    • Anxiety    • Depression    • Fibromyoma    • Migraine        Past Surgical History:   Past Surgical History:   Procedure Laterality Date   • BREAST EXCISIONAL BIOPSY Bilateral     starting 2255-9929   • BREAST MASS EXCISION Bilateral        Immunizations:   Immunization History   Administered Date(s) Administered   • COVID-19 (MODERNA) Monovalent Original Booster 12/11/2021   • Fluzone  >6mos 10/08/2024   • Fluzone (or Fluarix & Flulaval for VFC) >6mos 12/11/2021   • Tdap 04/26/2023        Medications:     Current Outpatient Medications:   •  buprenorphine-naloxone (SUBOXONE) 4-1 MG film sublingual film, PLACE 1/2 FILM UNDER TONGUE ONCE A DAY, Disp: , Rfl:   •  butalbital-acetaminophen-caffeine (Fioricet) -40 MG capsule capsule, Take 1 capsule by mouth Every 6 (Six) Hours As Needed (migraine)., Disp: 30 capsule, Rfl: 0  •  Emgality 120 MG/ML auto-injector pen, , Disp: , Rfl:   •  gabapentin (NEURONTIN) 100 MG capsule, Take 1 capsule by mouth At Night As Needed (nerve pain)., Disp: 30 capsule, Rfl: 0  •  ondansetron ODT (ZOFRAN-ODT) 4 MG disintegrating tablet, , Disp: , Rfl:   •  propranolol (INDERAL) 20 MG tablet, Take 1 tablet by mouth Daily As Needed (anxiety)., Disp: 30 tablet, Rfl: 11  •  Ubrelvy 100 MG tablet, TAKE 1 TABLET BY MOUTH ONCE IF NEEDED FOR MIGRAINE. AFTER 2 HOURS, A SECOND DOSE MAY BE TAKEN IF NEEDED. MAX DOSE  MG (2 TABLETS) IN 24 HOURS., Disp: , Rfl:   •  escitalopram (Lexapro) 20 MG tablet, Take 1 tablet by mouth Daily., Disp: 90 tablet, Rfl: 3    Allergies:   No Known Allergies    Family History:   Family History   Problem Relation Age of Onset   • Hypertension Father    • Stroke Father    • Hypertension Mother    • Hypertension Paternal Grandfather   "  • Hypertension Paternal Grandmother    • Diabetes Paternal Grandmother    • Hypertension Maternal Grandmother    • Hypertension Maternal Grandfather    • Diabetes Maternal Grandfather        Social History:   Social History     Socioeconomic History   • Marital status:    Tobacco Use   • Smoking status: Never   • Smokeless tobacco: Never   Vaping Use   • Vaping status: Never Used   Substance and Sexual Activity   • Alcohol use: Yes     Alcohol/week: 5.0 standard drinks of alcohol     Types: 3 Glasses of wine, 2 Drinks containing 0.5 oz of alcohol per week     Comment: occasionally / 1-2x monthly    • Drug use: Never   • Sexual activity: Yes     Partners: Male     Birth control/protection: None         Objective     Vital Signs  /70 (BP Location: Right arm, Patient Position: Sitting, Cuff Size: Adult)   Pulse 70   Resp 18   Ht 160 cm (62.99\")   Wt 67.1 kg (148 lb)   SpO2 98%   BMI 26.22 kg/m²   Estimated body mass index is 26.22 kg/m² as calculated from the following:    Height as of this encounter: 160 cm (62.99\").    Weight as of this encounter: 67.1 kg (148 lb).            Physical Exam  Vitals reviewed.   Constitutional:       Appearance: Normal appearance.   HENT:      Head: Normocephalic and atraumatic.   Cardiovascular:      Rate and Rhythm: Normal rate.   Pulmonary:      Effort: Pulmonary effort is normal. No respiratory distress.   Skin:     Comments: Erythematous scaly rash present around her R eye and the lateral sides of her face around her hairline   Neurological:      General: No focal deficit present.      Mental Status: She is alert and oriented to person, place, and time.   Psychiatric:         Mood and Affect: Mood normal.         Behavior: Behavior normal.          Assessment and Plan     Diagnoses and all orders for this visit:    1. MARCO ANTONIO (generalized anxiety disorder) (Primary)  -     escitalopram (Lexapro) 20 MG tablet; Take 1 tablet by mouth Daily.  Dispense: 90 tablet; " Refill: 3  -     propranolol (INDERAL) 20 MG tablet; Take 1 tablet by mouth Daily As Needed (anxiety).  Dispense: 30 tablet; Refill: 11    2. Carpal tunnel syndrome of left wrist    3. Contact dermatitis, unspecified contact dermatitis type, unspecified trigger  -     Ambulatory Referral to Dermatology         Assessment & Plan  MARCO ANTONIO  - Increased anxiety despite Lexapro 10 mg  - Seeing therapist monthly  Plan:  - Increase Lexapro to 20 mg  - Follow-up in one month to assess mood  - Continue propranolol as needed  - Consider BuSpar if anxiety persists    Periocular contact dermatitis:  - Dryness above eye spreading to sides of face  - No new facial products, no new allergens, no new detergents, no new makeup, no new shampoo or hair products. Unclear trigger.   - Uses CeraVe and La Roche-Posay  Plan:   - Start hydrocortisone 1% for 1-2 weeks  - Dermatology referral   - Recommend keeping the area clean and dry, avoid applying any makeup/sunscreen or new products to the area.       Carpal tunnel syndrome:  - Symptoms fluctuate, no hand weakness  Plan:   - Continue intermittent gabapentin use  - Wear brace more consistently  - Consider orthopedic referral if symptoms worsen. Offered referral today but she deferred      Follow-up:  - Follow-up in 1 month    Follow Up  Return in about 1 month (around 5/11/2025) for Follow Up.    Patient or patient representative verbalized consent for the use of Ambient Listening during the visit with  Liane Paniagua MD for chart documentation. 4/11/2025  08:07 EDT      Liane Paniagua MD   [facility]  [facility] [address] [address] [phone]    Please note that portions of this document may have been completed with a voice recognition program.      At [facility], we believe that sharing information builds trust and better relationships. You are receiving this note because you are  receiving care at [facility] or have recently visited. It is possible you will see health information before a provider has talked with you about it. This kind of information can be easy to misunderstand as it is a medical document. It is intended as [id number] communication. It is written in medical language and may contain abbreviations or verbiage that are unfamiliar. It may appear blunt or direct. Medical documents are intended to carry relevant information, facts as evident, and the clinical opinion of the practitioner.  To help you fully understand what it means for your health, we urge you to discuss this note with your provider.